# Patient Record
Sex: FEMALE | Race: WHITE | URBAN - NONMETROPOLITAN AREA
[De-identification: names, ages, dates, MRNs, and addresses within clinical notes are randomized per-mention and may not be internally consistent; named-entity substitution may affect disease eponyms.]

---

## 2020-07-13 ENCOUNTER — OFFICE VISIT (OUTPATIENT)
Dept: FAMILY MEDICINE CLINIC | Age: 60
End: 2020-07-13
Payer: COMMERCIAL

## 2020-07-13 VITALS
SYSTOLIC BLOOD PRESSURE: 136 MMHG | RESPIRATION RATE: 16 BRPM | OXYGEN SATURATION: 97 % | HEART RATE: 66 BPM | DIASTOLIC BLOOD PRESSURE: 78 MMHG | HEIGHT: 61 IN | WEIGHT: 182 LBS | BODY MASS INDEX: 34.36 KG/M2 | TEMPERATURE: 97.6 F

## 2020-07-13 PROCEDURE — G8427 DOCREV CUR MEDS BY ELIG CLIN: HCPCS | Performed by: PHYSICIAN ASSISTANT

## 2020-07-13 PROCEDURE — 3017F COLORECTAL CA SCREEN DOC REV: CPT | Performed by: PHYSICIAN ASSISTANT

## 2020-07-13 PROCEDURE — 99204 OFFICE O/P NEW MOD 45 MIN: CPT | Performed by: PHYSICIAN ASSISTANT

## 2020-07-13 PROCEDURE — 96372 THER/PROPH/DIAG INJ SC/IM: CPT | Performed by: PHYSICIAN ASSISTANT

## 2020-07-13 PROCEDURE — G8417 CALC BMI ABV UP PARAM F/U: HCPCS | Performed by: PHYSICIAN ASSISTANT

## 2020-07-13 PROCEDURE — 1036F TOBACCO NON-USER: CPT | Performed by: PHYSICIAN ASSISTANT

## 2020-07-13 RX ORDER — FLUOXETINE 10 MG/1
CAPSULE ORAL
COMMUNITY
Start: 2020-06-30

## 2020-07-13 RX ORDER — METHYLPREDNISOLONE ACETATE 80 MG/ML
60 INJECTION, SUSPENSION INTRA-ARTICULAR; INTRALESIONAL; INTRAMUSCULAR; SOFT TISSUE ONCE
Status: COMPLETED | OUTPATIENT
Start: 2020-07-13 | End: 2020-07-13

## 2020-07-13 RX ORDER — DIPHENHYDRAMINE HYDROCHLORIDE 50 MG/ML
25 INJECTION INTRAMUSCULAR; INTRAVENOUS ONCE
Status: COMPLETED | OUTPATIENT
Start: 2020-07-13 | End: 2020-07-13

## 2020-07-13 RX ORDER — PREDNISONE 20 MG/1
TABLET ORAL
Qty: 18 TABLET | Refills: 0 | Status: SHIPPED | OUTPATIENT
Start: 2020-07-13

## 2020-07-13 RX ORDER — ATORVASTATIN CALCIUM 20 MG/1
TABLET, FILM COATED ORAL
COMMUNITY
Start: 2020-05-15

## 2020-07-13 RX ADMIN — METHYLPREDNISOLONE ACETATE 60 MG: 80 INJECTION, SUSPENSION INTRA-ARTICULAR; INTRALESIONAL; INTRAMUSCULAR; SOFT TISSUE at 15:25

## 2020-07-13 RX ADMIN — DIPHENHYDRAMINE HYDROCHLORIDE 25 MG: 50 INJECTION INTRAMUSCULAR; INTRAVENOUS at 15:25

## 2020-07-13 SDOH — HEALTH STABILITY: MENTAL HEALTH: HOW OFTEN DO YOU HAVE A DRINK CONTAINING ALCOHOL?: NEVER

## 2020-07-13 NOTE — PROGRESS NOTES
20  Marbella Appiah : 1960 Sex: female  Age 61 y.o. Subjective:  Chief Complaint   Patient presents with   Aleah Chopra 83     pt states bee sting today was having a reaction          HPI:   Marbella Appiah , 61 y.o. female presents to express care for evaluation of bee sting. The patient states that she was cutting the grass on her lunch break and she got stung 3 different spots by what she believes was a yellow jacket. The patient states that the one is to the right forearm. There is 1 to the right leg and 1 to the left posterior leg. The patient states that she had some erythema in each of the areas but she also started noticing quite a bit of erythema noted to the upper extremities, her face. She never had any lip or tongue swelling. She is never had any chest pain, shortness of breath. No difficulty breathing. The patient otherwise does appear well. The patient did take a 10 mg Zyrtec about 1/2-hour prior to arrival here. The incident occurred about an hour ago. The patient is not in any apparent distress or discomfort. ROS:   Unless otherwise stated in this report the patient's positive and negative responses for review of systems for constitutional, eyes, ENT, cardiovascular, respiratory, gastrointestinal, neurological, , musculoskeletal, and integument systems and related systems to the presenting problem are either stated in the history of present illness or were not pertinent or were negative for the symptoms and/or complaints related to the presenting medical problem. Positives and pertinent negatives as per HPI. All others reviewed and are negative. PMH:     Past Medical History:   Diagnosis Date    Depression     HLD (hyperlipidemia)        No past surgical history on file. No family history on file.     Medications:     Current Outpatient Medications:     atorvastatin (LIPITOR) 20 MG tablet, , Disp: , Rfl:     FLUoxetine (PROZAC) 10 MG capsule, , Disp: , of motion  Neurological: Alert and oriented x4, normal speech      Testing:           Medical Decision Making:     Vital signs reviewed    Past medical history reviewed. Allergies reviewed. Medications reviewed. Social history reviewed. Patient on arrival does not appear to be in any apparent distress or discomfort. The patient will be treated with intramuscular injection of Benadryl 25 mg once. The patient will be given methylprednisone 60 mg IM. We will monitor the patient here. The patient will be reassessed and reevaluated. 1540: The patient was reassessed and reevaluated. The patient states that the symptoms have not returned. The patient is not having any redness, difficulty breathing. She is not having any of the palpitations or sensation in her chest.  The patient states that the areas that she was stung by still hurt and are still slightly red. The patient states that she always typically gets those localized reaction but she is never had anything that has overcome her in the past.  Patient has had to go get shots of steroids in the past.    Repeat vital signs showed a blood pressure 136/78. Heart rate is 66. Respiratory rate 16. Pulse ox 97%. The patient will be sent home. The patient will continue to take steroids at home. The patient will continue with antihistamine. She was comfortable with this plan has no other questions or concerns. We will hold off on further treatment with antibiotics if the redness continues may consider having additional coverage. Clinical Impression:   Kesha Landers was seen today for insect bite.     Diagnoses and all orders for this visit:    Bee sting reaction, accidental or unintentional, initial encounter    Allergic reaction, initial encounter    Other orders  -     diphenhydrAMINE (BENADRYL) injection 25 mg  -     methylPREDNISolone acetate (DEPO-MEDROL) injection 60 mg  -     predniSONE (DELTASONE) 20 MG tablet; 3 tablets once daily for 3 days, 2 tablets once daily for 3 days, one tablet once daily for 3 days        The patient is to call for any concerns or return if any of the signs or symptoms worsen. The patient is to follow-up with PCP in the next 2-3 days for repeat evaluation repeat assessment or go directly to the emergency department.      SIGNATURE: Marine Huston III, PA-C